# Patient Record
Sex: FEMALE | Race: OTHER
[De-identification: names, ages, dates, MRNs, and addresses within clinical notes are randomized per-mention and may not be internally consistent; named-entity substitution may affect disease eponyms.]

---

## 2018-02-06 ENCOUNTER — HOSPITAL ENCOUNTER (EMERGENCY)
Dept: HOSPITAL 80 - FED | Age: 63
Discharge: HOME | End: 2018-02-06
Payer: SELF-PAY

## 2018-02-06 VITALS
SYSTOLIC BLOOD PRESSURE: 113 MMHG | TEMPERATURE: 98.4 F | OXYGEN SATURATION: 96 % | DIASTOLIC BLOOD PRESSURE: 65 MMHG | HEART RATE: 69 BPM

## 2018-02-06 VITALS — RESPIRATION RATE: 18 BRPM

## 2018-02-06 DIAGNOSIS — E86.9: ICD-10-CM

## 2018-02-06 DIAGNOSIS — K59.00: Primary | ICD-10-CM

## 2018-02-06 LAB — PLATELET # BLD: 234 10^3/UL (ref 150–400)

## 2018-02-06 NOTE — EDPHY
HPI/HX/ROS/PE/MDM


Narrative: 





CHIEF COMPLAINT: Back spasm





HPI:  The patient is a 63 y/o female arriving via  EMS complaining of severe 

back spasm onset on Friday, about 3 days ago. She describes the sensation of 

"seizing" located along her lower back and worse on the left side. Her pain is 

sometimes worse with movement, but also present at rest. She cannot tell if it'

s worse in a seated position and it's unclear if the pain ever resolves 

completely or not. She has been taking ibuprofen throughout the weekend with 

minimal relief and decided to call EMS for help this morning. EMS administered 

5mg IV Valium en route with some improvement in patient's spasms, but she 

continues to complain of severe pain on arrival here. She denies preceding 

trauma, recent illness, weakness, paresthesias, incontinence, urinary symptoms, 

fever, or other complaints. 





REVIEW OF SYSTEMS:


Aside from elements discussed in the HPI, a comprehensive 10-point review of 

systems was reviewed and is negative.





PMH: Hypothyroidism, basal cell carcinoma with excision, hernia surgery





SOCIAL HISTORY: Employed as a teacher. Lives in [place]. Single. 





PHYSICAL EXAM:


General:Patient is alert, appears uncomfortable.


ENT:Eyes are normal to inspection.  ENT inspection normal.


Neck: Normal inspection.  Full range of motion.


Respiratory:No respiratory distress.  Breath sounds normal bilaterally.


Cardiovascular: Regular rate and rhythm.  Strong peripheral pulses.  Normal cap 

refill.


Abdomen:The abdomen is nontender to palpation. There are no peritoneal signs. 

No pulsatile mass. 


Back: Normal to inspection.  No tenderness to palpation.


Skin: Normal color.  No rash.  Warm and dry.


Extremities: Normal appearance.  Full range of motion.


Neuro: Oriented x3.  Normal motor function.  Normal sensory function. 5/5 

strength BLEs to DF, PF, knee ext and flexion.  Normal LTS.  No saddle 

anesthesia. 


ED Course: 





This is a 63 y/o female who presents with 3-days of severe back spasm. No 

report of recent trauma. She has a normal neurologic exam and I do not suspect 

acute cauda equina syndrome requiring emergent MRI imaging. Her level of pain 

that seems to be waxing and waning during my assessment could indicate a kidney 

stone or other intraabdominal process. Musculoskeletal cause also a 

possibility. Plan for IV, labs, UA, abdominal CT, and symptom management. 1mg 

IV Dilaudid and 1L IV NS administered. 





Reassessed patient. Her pain has improved significantly.





Patient has been discussing cost of CT with her insurance for over an hour, but 

has finally decided she would like to proceed with it.





CT Abdomen/Pelvis shows constipation and likely benign calcified splenic cyst 

per Dr. Urban, radiology.





Reassessed patient and discussed findings. Exam remains benign. Patient will be 

discharged with Medrol script and standard back pain and constipation care 

instructions. Recommended follow up with back specialist in a few days. Return 

precautions discussed. She is comfortable with this plan. 


MDM: 





This patient presents with midline lumbar spine pain with associated back spasm

, made worse with movement.  We performed an extensive workup to rule out other 

causes of back pain, and there is no evidence of nephrolithiasis, renal mass, 

diverticulitis, AAA or fracture.  The patient has no risk factors for epidural 

abscess or deep space infection.  There are no signs of cauda equina or other 

spinal emergency.  I think she is appropriate for outpatient management with 

steroids, muscle relaxants and pain medication, followed by close follow-up 

with NSG for likely MRI. She agrees with this plan.  We discussed strict return 

precautions. 





- Data Points


Imaging Results: 


 Imaging Impressions





Abdomen/Pelvis CT  02/06/18 07:56


Impression:  


1. Constipation.


2. Likely benign 4.5-cm splenic calcification, most likely related to a cyst. 

Consider CT follow up in 3 to 6 months.


3. Bladder distention.


4. Additional findings as above.


 


Findings and recommendations discussed with padmini John for Dr. Paul Bo on February 6, 2018 at 1115 hours. 


 


Attention:  This CT examination is specifically designed to evaluate patients 

who are clinically suspected of having acute obstructive uropathy.  This 

examination does not use radiographic contrast, and as such, provides only a 

limited evaluation of the abdomen, pelvis and retroperitoneum.  If there is 

further clinical suspicion for pathological conditions other than obstructive 

uropathy, a complete CT evaluation of the abdomen and pelvis utilizing 

intravenous and oral contrast should be considered.


 


 


 











Imaging: Discussed imaging studies w/ On call Radiologist, I viewed and 

interpreted images myself


Laboratory Results: 


 Laboratory Results





 02/06/18 08:30 





 02/06/18 08:30 





 











  02/06/18 02/06/18





  08:30 08:30


 


WBC    8.61 10^3/uL 10^3/uL





    (3.80-9.50) 


 


RBC    4.04 10^6/uL L 10^6/uL





    (4.18-5.33) 


 


Hgb    12.2 g/dL L g/dL





    (12.6-16.3) 


 


Hct    35.5 % L %





    (38.0-47.0) 


 


MCV    87.9 fL fL





    (81.5-99.8) 


 


MCH    30.2 pg pg





    (27.9-34.1) 


 


MCHC    34.4 g/dL g/dL





    (32.4-36.7) 


 


RDW    13.7 % %





    (11.5-15.2) 


 


Plt Count    234 10^3/uL 10^3/uL





    (150-400) 


 


MPV    9.0 fL fL





    (8.7-11.7) 


 


Neut % (Auto)    77.1 % H %





    (39.3-74.2) 


 


Lymph % (Auto)    13.9 % L %





    (15.0-45.0) 


 


Mono % (Auto)    7.8 % %





    (4.5-13.0) 


 


Eos % (Auto)    0.5 % L %





    (0.6-7.6) 


 


Baso % (Auto)    0.2 % L %





    (0.3-1.7) 


 


Nucleat RBC Rel Count    0.0 % %





    (0.0-0.2) 


 


Absolute Neuts (auto)    6.64 10^3/uL H 10^3/uL





    (1.70-6.50) 


 


Absolute Lymphs (auto)    1.20 10^3/uL 10^3/uL





    (1.00-3.00) 


 


Absolute Monos (auto)    0.67 10^3/uL 10^3/uL





    (0.30-0.80) 


 


Absolute Eos (auto)    0.04 10^3/uL 10^3/uL





    (0.03-0.40) 


 


Absolute Basos (auto)    0.02 10^3/uL 10^3/uL





    (0.02-0.10) 


 


Absolute Nucleated RBC    0.00 10^3/uL 10^3/uL





    (0-0.01) 


 


Immature Gran %    0.5 % %





    (0.0-1.1) 


 


Immature Gran #    0.04 10^3/uL 10^3/uL





    (0.00-0.10) 


 


Sodium  141 mEq/L mEq/L  





   (135-145)  


 


Potassium  3.5 mEq/L mEq/L  





   (3.5-5.2)  


 


Chloride  113 mEq/L H mEq/L  





   ()  


 


Carbon Dioxide  19 mEq/l L mEq/l  





   (22-31)  


 


Anion Gap  9 mEq/L mEq/L  





   (8-16)  


 


BUN  9 mg/dL mg/dL  





   (7-23)  


 


Creatinine  0.7 mg/dL mg/dL  





   (0.6-1.0)  


 


Estimated GFR  > 60   





   


 


Glucose  93 mg/dL mg/dL  





   ()  


 


Calcium  8.7 mg/dL mg/dL  





   (8.5-10.4)  











Medications Given: 


 








Discontinued Medications





Hydromorphone HCl (Dilaudid)  1 mg IVP EDNOW ONE


   Stop: 02/06/18 07:17


   Last Admin: 02/06/18 07:26 Dose:  1 mg


Sodium Chloride (Ns)  1,000 mls @ 0 mls/hr IV EDNOW ONE; Wide Open


   PRN Reason: Protocol


   Stop: 02/06/18 07:17


   Last Admin: 02/06/18 07:25 Dose:  1,000 mls


Ketorolac Tromethamine (Toradol)  30 mg IVP EDNOW ONE


   Stop: 02/06/18 11:00


   Last Admin: 02/06/18 11:02 Dose:  30 mg








General


Initial Vital Signs: 


 Initial Vital Signs











Temperature (C)  36.4 C   02/06/18 07:17


 


Heart Rate  62   02/06/18 07:17


 


Respiratory Rate  18   02/06/18 07:17


 


Blood Pressure  119/67   02/06/18 07:17


 


O2 Sat (%)  98   02/06/18 07:17








 











O2 Delivery Mode               Room Air














Allergies/Adverse Reactions: 


 





No Known Allergies Allergy (Unverified 02/06/18 07:20)


 








Home Medications: 














 Medication  Instructions  Recorded


 


Cyclobenzaprine [Flexeril] 10 mg PO TID #15 tab 02/06/18


 


Levothyroxine  02/06/18


 


methylPREDNISolone [Medrol Dose 1 each PO AD #1 ea 02/06/18





Lemuel]  


 


oxyCODONE/APAP 5/325 [Percocet 5 - 10 mg PO Q4-6PRN PRN #14 tab 02/06/18





5/325]  














Departure





- Departure


Disposition: Home, Routine, Self-Care


Clinical Impression: 


 Constipation, Back pain





Condition: Good


Instructions:  Constipation (ED), High Fiber Diet (ED), Back Pain (ED)


Additional Instructions: 


1. Take steroids as prescribed. 


2. Use Flexeril as prescribed as needed for muscle spasm. This medication can 

make you drowsy. Do not use prior to driving or any activity that could put you 

or others at risk in case of sleepiness.


3. Use Oxycodone as prescribed when needed for severe pain.


4. Follow up with back specialist in the next 2-3 days.


5. Increase fluid and fiber intake for constipation. You can also use over-the-

counter treatments such as Miralax as directed on the packaging if needed. 


6. Return to the ED for any worsening of condition.





There was an incidental findings of a calcified cyst in your spleen. Recommend 

follow up CT in 3-6 months to monitor for any changes. 


Referrals: 


[facility] [Outside] - As per Instructions


Prescriptions: 


Cyclobenzaprine [Flexeril] 10 mg PO TID #15 tab


methylPREDNISolone [Medrol Dose Lemuel] 1 each PO AD #1 ea


oxyCODONE/APAP 5/325 [Percocet 5/325] 5 - 10 mg PO Q4-6PRN PRN #14 tab


 PRN Reason: For Pain


Report Scribed for: Paul Bo


Report Scribed by: Dora Lang


Date of Report: 02/06/18


Time of Report: 07:18


Physician Review and Approval Statement: Portions of this note were transcribed 

by an ED scribe.  I personally performed the history, physical exam, and 

medical decision making; and confirm the accuracy of the information in the 

transcribed note.